# Patient Record
Sex: MALE | Race: WHITE | NOT HISPANIC OR LATINO | URBAN - METROPOLITAN AREA
[De-identification: names, ages, dates, MRNs, and addresses within clinical notes are randomized per-mention and may not be internally consistent; named-entity substitution may affect disease eponyms.]

---

## 2024-08-16 ENCOUNTER — OFFICE VISIT (OUTPATIENT)
Dept: PODIATRY | Facility: CLINIC | Age: 59
End: 2024-08-16
Payer: COMMERCIAL

## 2024-08-16 ENCOUNTER — HOSPITAL ENCOUNTER (OUTPATIENT)
Dept: RADIOLOGY | Facility: HOSPITAL | Age: 59
Discharge: HOME | End: 2024-08-16
Attending: PODIATRIST
Payer: COMMERCIAL

## 2024-08-16 VITALS — WEIGHT: 225 LBS | BODY MASS INDEX: 32.21 KG/M2 | HEIGHT: 70 IN

## 2024-08-16 DIAGNOSIS — M21.622 TAILOR'S BUNION OF BOTH FEET: ICD-10-CM

## 2024-08-16 DIAGNOSIS — M20.5X9 HALLUX LIMITUS, UNSPECIFIED LATERALITY: Primary | ICD-10-CM

## 2024-08-16 DIAGNOSIS — M21.621 TAILOR'S BUNION OF BOTH FEET: ICD-10-CM

## 2024-08-16 DIAGNOSIS — M72.2 PLANTAR FASCIAL FIBROMATOSIS OF RIGHT FOOT: ICD-10-CM

## 2024-08-16 DIAGNOSIS — M20.5X9 HALLUX LIMITUS, UNSPECIFIED LATERALITY: ICD-10-CM

## 2024-08-16 PROCEDURE — 73630 X-RAY EXAM OF FOOT: CPT | Mod: TC,50,PO

## 2024-08-16 PROCEDURE — 73630 X-RAY EXAM OF FOOT: CPT | Mod: 26,,, | Performed by: RADIOLOGY

## 2024-08-16 PROCEDURE — 99999 PR PBB SHADOW E&M-NEW PATIENT-LVL III: CPT | Mod: PBBFAC,,, | Performed by: PODIATRIST

## 2024-08-16 RX ORDER — HYDROCODONE BITARTRATE AND ACETAMINOPHEN 10; 325 MG/15ML; MG/15ML
SOLUTION ORAL
COMMUNITY
Start: 2023-09-11

## 2024-08-16 RX ORDER — COLCHICINE 0.6 MG/1
TABLET, FILM COATED ORAL
COMMUNITY
Start: 1997-08-11

## 2024-08-16 RX ORDER — CYCLOBENZAPRINE HCL 10 MG
10 TABLET ORAL 3 TIMES DAILY
COMMUNITY

## 2024-08-16 RX ORDER — AMLODIPINE BESYLATE 10 MG/1
1 TABLET ORAL DAILY
COMMUNITY
Start: 2016-01-11

## 2024-08-16 RX ORDER — DEXLANSOPRAZOLE 60 MG/1
1 CAPSULE, DELAYED RELEASE ORAL DAILY
COMMUNITY
Start: 2018-01-11

## 2024-08-16 RX ORDER — GABAPENTIN 300 MG/1
600 CAPSULE ORAL 2 TIMES DAILY
COMMUNITY
Start: 2024-08-14

## 2024-08-16 NOTE — PROGRESS NOTES
Subjective:      Patient ID: Terry Leal is a 59 y.o. male.    Chief Complaint: Chelsea Stewart is a 59 y.o. male who presents to the podiatry clinic  with complaint of  bilateral foot pain at times associated with his tailors bunions and bunions and right foot plantar arch pain at times with a palpable mass. Onset of the symptoms was several years ago. Precipitating event: none known. Current symptoms include: ability to bear weight, but with some pain. Aggravating factors: any weight bearing. Symptoms have waxed and waned. Patient has had prior foot problems. Evaluation to date: none. Treatment to date: none. Patients rates pain 2/10 on pain scale.    Review of Systems   Constitutional: Negative for chills and fever.   Cardiovascular:  Negative for claudication and leg swelling.   Respiratory:  Negative for shortness of breath.    Skin:  Negative for itching, nail changes and rash.   Musculoskeletal:  Negative for muscle cramps, muscle weakness and myalgias.   Gastrointestinal:  Negative for nausea and vomiting.   Neurological:  Negative for focal weakness, loss of balance, numbness and paresthesias.           Objective:      Physical Exam  Constitutional:       General: He is not in acute distress.     Appearance: He is well-developed. He is not diaphoretic.   Cardiovascular:      Pulses:           Dorsalis pedis pulses are 2+ on the right side and 2+ on the left side.        Posterior tibial pulses are 2+ on the right side and 2+ on the left side.      Comments: < 3 sec capillary refill time to toes 1-5 bilateral. Toes and feet are warm to touch proximally with normal distal cooling b/l. There is some hair growth on the feet and toes b/l. There is no edema b/l. No spider veins or varicosities present b/l.     Musculoskeletal:      Comments: Equinus noted b/l ankles with < 10 deg DF noted. MMT 5/5 in DF/PF/Inv/Ev resistance with no reproduction of pain in any direction. Passive range of motion of ankle and pedal  joints is painless b/l.    Right plantar arch fibroma palpated along the medial band of the plantar fascia.    Tailor bunion present 5th mtpj bilateral with medial deviation of 5th toe, prominent bony bump lateral 5th mtpj, and pain to palpation without evidence of trauma or infection.    Bilateral first MTPJ there is palpable dorsal medial spurring noted with limited ROM no pain with ROM   Skin:     General: Skin is warm and dry.      Coloration: Skin is not pale.      Findings: No abrasion, bruising, burn, ecchymosis, erythema, laceration, lesion, petechiae or rash.      Nails: There is no clubbing.      Comments: Skin temperature, texture and turgor within normal limits.   Neurological:      Mental Status: He is alert and oriented to person, place, and time.      Sensory: No sensory deficit.      Motor: No tremor, atrophy or abnormal muscle tone.      Comments: Negative tinel sign bilateral.   Psychiatric:         Behavior: Behavior normal.               Assessment:       Encounter Diagnoses   Name Primary?    Hallux limitus, unspecified laterality Yes    Tailor's bunion of both feet     Plantar fascial fibromatosis of right foot          Plan:       Terry was seen today for bunions.    Diagnoses and all orders for this visit:    Hallux limitus, unspecified laterality  -     X-Ray Foot Complete Bilateral; Future    Tailor's bunion of both feet  -     X-Ray Foot Complete Bilateral; Future    Plantar fascial fibromatosis of right foot      I counseled the patient on his conditions, their implications and medical management.    Discussed importance of supportive shoes with accommodative toe box to reduce pressure and irritation to forefoot.     Will get x-rays for baseline of arthritic changes to the first MTPJ and tailor bunion    Patient will obtain over the counter arch supports and wear them in shoes whenever possible.  Athletic shoes intended for walking or running are usually best.    Avoid barefoot or  flats    Use voltaren gel as needed    Return PRN    Juan Manuel Rivera, DPM

## 2024-08-16 NOTE — PATIENT INSTRUCTIONS
2. Supportive shoes at all times (athletic shoe including Altra, trevino, new balance, asics, HOKA or casual shoes like Dansko, Mini, Naot, Vionoic, Fit flop  clog or wedge with extra heel padding and arch support. For house slippers would recommend Fitflop or Spenco found on amazon.com, Never walk barefoot or in flats.    (Varsity sports, PhidippDropShip, Podio company, Masseys, Goodfeet, Cantilever, Feet First, Foot Solutions, Therapeutic shoes, SAS, [facility] pro shop) [email or web address]    3. Orthotic (recommend the following brands: Superfeet, Spenco, Powerstep, Sof Sole Fit Series)